# Patient Record
(demographics unavailable — no encounter records)

---

## 2024-10-31 NOTE — HISTORY OF PRESENT ILLNESS
[N] : Patient reports normal menses [IUD] : has an intrauterine device [Y] : Positive pregnancy history [Menarche Age: ____] : age at menarche was [unfilled] [No] : Patient does not have concerns regarding sex [Currently Active] : currently active [Men] : men [Mammogramdate] : 08/22/24 [TextBox_19] : BR0 [LMPDate] : 10/30/24 [de-identified] : PARAGARD [PGHxTotal] : 3 [[facility]xFul[facility]] : 1 [[facility] Center[facility]] : 1 [PGHxABSpont] : 2 [FreeTextEntry1] : 10/30/24

## 2024-10-31 NOTE — DISCUSSION/SUMMARY
[FreeTextEntry1] : 41 y/o P1 presenting for f/u of Paragard and fibroid today. Reviewed Paragard in proper position, fibroid stable - Discussed posterior myoma and possible submucosal component. Discussed slightly higher risk of Paragard expulsion with this in place but given last Paragard stayed in place this one likely will as well. - Patient expressed concerns about fibroid and whether further treatment is needed. Discussed that fibroids can cause heavy and painful menses but as long as she does not have these symptoms, no further management of them is needed, can continue to observe. - Advised change in bleeding pattern likely from new Paragard insertion, should return to baseline after 3 months - Discussed if bleeding still bothersome after 3 months, could be myoma. Discussed hormonal management options and pt reports bad side effects to hormonal contraceptives in the past. Discussed surgical options and that it appears if surgery is needed, based on sono appearance could likely be hysteroscopic, although would need MRI to confirm. - Continue to monitor fibroid for now, return in 6 months for sono to assess stability in size.  Angel Mac MD.

## 2024-12-08 NOTE — HISTORY OF PRESENT ILLNESS
[Menarche Age: ____] : age at menarche was [unfilled] [No] : Patient does not have concerns regarding sex [Currently Active] : currently active [Men] : men [IUD] : has an intrauterine device [Y] : Positive pregnancy history [Mammogramdate] : 08/22/24 [TextBox_19] : BR-0 [GonorrheaDate] : 09/19/24 [TextBox_63] : neg [ChlamydiaDate] : 09/19/24 [TextBox_68] : neg [LMPDate] : 11/23/24 [de-identified] : PARAGARD [PGHxTotal] : 3 [[facility]xFulerm] : 1 [[facility]iving] : 1 [PGHxABSpont] : 2 [FreeTextEntry1] : 11/23/24

## 2024-12-08 NOTE — DISCUSSION/SUMMARY
[FreeTextEntry1] : 42 y/o with paraguard IUD in place complains of one episode irregular bleeding -  reviewing the patient's history and results discussed with patient this is not likely to be abnormal it is not unusual to have 1-2 days of dark old blood prior to menses.  I do not believe this is related to her fibroid. Reviewed recent blood work - patient was midcycle when it was done - elevated FSH could be related to perimenopause/menopausal changes however given the timing unlikely to be accurate  Patient states she is very happy with paraguard and not interested in changing IUDs  At this time will continue to monitor

## 2024-12-08 NOTE — REASON FOR VISIT
[Follow-Up] : a follow-up evaluation of [FreeTextEntry2] : F/U visit regarding irregular Menses and Sono report. She deals with Candida infections often

## 2024-12-08 NOTE — HISTORY OF PRESENT ILLNESS
[Menarche Age: ____] : age at menarche was [unfilled] [No] : Patient does not have concerns regarding sex [Currently Active] : currently active [Men] : men [IUD] : has an intrauterine device [Y] : Positive pregnancy history [Mammogramdate] : 08/22/24 [TextBox_19] : BR-0 [GonorrheaDate] : 09/19/24 [TextBox_63] : neg [ChlamydiaDate] : 09/19/24 [TextBox_68] : neg [LMPDate] : 11/23/24 [de-identified] : PARAGARD [PGHxTotal] : 3 [[facility] CenterxFul[facility]] : 1 [[facility]iving] : 1 [PGHxABSpont] : 2 [FreeTextEntry1] : 11/23/24

## 2025-04-21 NOTE — REASON FOR VISIT
[Follow-Up] : a follow-up evaluation of [FreeTextEntry2] : Possible Infection  pt reports burning sensation during urination, Frequent urination,

## 2025-04-21 NOTE — RESULTS/DATA
[Extensive counseling about Hormone Replacement Therapy. Reviewed both FDA-approved and non-FDA-approved options] : Extensive counseling about Hormone Replacement Therapy. Reviewed both FDA-approved and non-FDA-approved options such as estrogen/progesterone options, bioidentical hormones, compounded formulation, and pellets. All concerns were addressed, and questions were answered. [Reviewed holistic approach including cooling blankets, fans, monitoring of triggers and avoiding them as possible] : Reviewed holistic approach including cooling blankets, fans, monitoring of triggers and avoiding them as possible, herbal treatments - (not studied in RCTs), acupuncture, stress management, diet & exercise, CBT, SSRIs, Veozah, Gabapentin, Hormonal Therapy.

## 2025-04-21 NOTE — PLAN
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

## 2025-04-21 NOTE — PHYSICAL EXAM
[Appropriately responsive] : appropriately responsive [Alert] : alert [No Acute Distress] : no acute distress [Oriented x3] : oriented x3 [FreeTextEntry8] : mood- anxious, affect congruent, speech- normal rate and prosody, appearance- neat, hijab worn, clean, thought process logical, goal directed, no psychomotor activity, No SI or HI

## 2025-04-21 NOTE — HISTORY OF PRESENT ILLNESS
[Y] : Positive pregnancy history [Menarche Age: ____] : age at menarche was [unfilled] [Men] : men [No] : No [Mammogramdate] : 08/22/2024 [TextBox_19] : Bilateral br0 [BreastSonogramDate] : 08/27/2024 [TextBox_25] : Complete RT breast   br2 [PapSmeardate] : 08/2024 [TextBox_31] : wnl per pt  [BoneDensityDate] : n/a [TextBox_43] :  wnl per pt, every 10yrs, 08/2023.   endoscopy: wnl per pt, pt states has colon issues, diagnosed with fibromyalgia [GonorrheaDate] : 09/19/2024 [TextBox_63] : neg  [ChlamydiaDate] : 09/19/2024 [TextBox_68] : neg  [LMPDate] : 04/11/2025 [de-identified] : ParaGard-09/19/2024 [PGHxTotal] : 3 [[facility]] : 1 [[facility]iving] : 1 [PGHxABSpont] : 2 [FreeTextEntry1] : pt states pain during intercourse, vaginal dryness, Low libido tried cocnut oil [FreeTextEntry2] :

## 2025-05-02 NOTE — PHYSICAL EXAM
Name band; [Chaperoned Physical Exam] : A chaperone was present in the examining room during all aspects of the physical examination. [Appropriately responsive] : appropriately responsive [Alert] : alert [No Acute Distress] : no acute distress [Oriented x3] : oriented x3 [Labia Majora] : normal [Labia Minora] : normal [IUD String] : an IUD string was noted [Normal] : normal [Uterine Adnexae] : normal [FreeTextEntry4] : Some tension noted at the pelvic floor muscles, worse on right.

## 2025-05-02 NOTE — HISTORY OF PRESENT ILLNESS
[Patient reported PAP Smear was normal] : Patient reported PAP Smear was normal [Patient reported colonoscopy was normal] : Patient reported colonoscopy was normal [IUD] : has an intrauterine device [Y] : Positive pregnancy history [Menarche Age: ____] : age at menarche was [unfilled] [No] : Patient does not have concerns regarding sex [Currently Active] : currently active [Mammogramdate] : 08/22/24 [TextBox_19] : BR0 [PapSmeardate] : 08/2024 [ColonoscopyDate] : 08/2023 [GonorrheaDate] : 09/19/24 [TextBox_63] : NEG [ChlamydiaDate] : 09/19/24 [TextBox_68] : NEG [LMPDate] : 04/11/25 [de-identified] : PARAGARD [PGHxTotal] : 3 [[facility] CenterxFul[facility]] : 1 [[facility]iving] : 1 [PGHxABSpont] : 2 [FreeTextEntry1] : 04/11/25

## 2025-05-02 NOTE — DISCUSSION/SUMMARY
[FreeTextEntry1] : 42 y/o P1 LMP 4/11 with submucosal myoma, previously asymptomatic but now with spotting surrounding menses and slightly heavier menses, also with symptoms concerning perimenopause but with regular menses still and a borderline normal FSH, presenting to discuss management.  #Fibroid/Bleeding - Reviewed it is possible myoma is contributing to bleeding changes, given low invasiveness can consider hysteroscopic myomectomy to remove. Pt interested in this. - Pelvic MRI needed prior to surgery as on sono it is unclear if the myoma reaches the serosa. Still primarily intracavitary thus hysteroscopy still appropriate, but if near serosa may intentionally need to leave 5-10% of myoma in situ to avoid perforation. - Task sent to schedule hysteroscopic myomectomy, Paragard removal, Paragard insertion  #Vaginal dryness/pain with sex - Reviewed possible component of PFD, recommended pelvic floor PT as it will help with this. Pt interested in this, referral given today - Reviewed no obvious atrophy on exam, but given complaint of dryness, trial of vaginal estrogen could be considered. May not help, but also has virtually no risk. Pt expressed understanding, wishes to tyr vaginal estrogen, script sent today.  #Hot flashes - Possible perimenopause but given regular menses and FSH still normal, may not be related - Will recheck FSH at next visit, but if still not menopausal range unsure if related. Discussed systemic E2 can be considered to see if it helps with symptoms, but will hold off on this for now, can consider next visit  - Return in 6w for visit to check FSH and check in on vaginal estradiol use  Angel Mac MD

## 2025-05-02 NOTE — PHYSICAL EXAM
[Chaperoned Physical Exam] : A chaperone was present in the examining room during all aspects of the physical examination. [Appropriately responsive] : appropriately responsive [Alert] : alert [No Acute Distress] : no acute distress [Oriented x3] : oriented x3 [Labia Majora] : normal [Labia Minora] : normal [IUD String] : an IUD string was noted [Normal] : normal [Uterine Adnexae] : normal [FreeTextEntry4] : Some tension noted at the pelvic floor muscles, worse on right.

## 2025-05-02 NOTE — HISTORY OF PRESENT ILLNESS
[Patient reported PAP Smear was normal] : Patient reported PAP Smear was normal [Patient reported colonoscopy was normal] : Patient reported colonoscopy was normal [IUD] : has an intrauterine device [Y] : Positive pregnancy history [Menarche Age: ____] : age at menarche was [unfilled] [No] : Patient does not have concerns regarding sex [Currently Active] : currently active [Mammogramdate] : 08/22/24 [TextBox_19] : BR0 [PapSmeardate] : 08/2024 [ColonoscopyDate] : 08/2023 [GonorrheaDate] : 09/19/24 [TextBox_63] : NEG [ChlamydiaDate] : 09/19/24 [TextBox_68] : NEG [LMPDate] : 04/11/25 [de-identified] : PARAGARD [PGHxTotal] : 3 [[facility]alexFulerm] : 1 [[facility] Servicesiving] : 1 [PGHxABSpont] : 2 [FreeTextEntry1] : 04/11/25

## 2025-05-23 NOTE — PROCEDURE
[IUD Removal] : intrauterine device (IUD) removal [Time out performed] : Pre-procedure time out performed.  Patient's name, date of birth and procedure confirmed. [Consent Obtained] : Consent obtained [Risks] : risks [Benefits] : benefits [Alternatives] : alternatives [Patient] : patient [Speculum Placed] : speculum placed [Strings Visualized] : strings visualized [IUD Discarded] : IUD discarded [Sent to Pathology] : specimen was placed in buffered formalin and sent for pathology [Tolerated Well] : Patient tolerated the procedure well [No Complications] : no complications [Heavy Vaginal Bleeding] : for heavy vaginal bleeding [Pelvic Pain] : for pelvic pain [de-identified] : Pain

## 2025-05-23 NOTE — RESULTS/DATA
[TextEntry] : TVUS with remaining fibroid seen 2.7 x 2.2 x 2.1 cm, also with possibility of Paragard being malrotated, seen at fundus with what appears to be the shaft at fundus, arms not clear, and nothing seen lower in the cavity on sagittal, but is difficult to assess.

## 2025-05-23 NOTE — HISTORY OF PRESENT ILLNESS
[Patient reported PAP Smear was normal] : Patient reported PAP Smear was normal [Patient reported colonoscopy was normal] : Patient reported colonoscopy was normal [IUD] : has an intrauterine device [Y] : Positive pregnancy history [Menarche Age: ____] : age at menarche was [unfilled] [No] : Patient does not have concerns regarding sex [Currently Active] : currently active [LMP unknown] : LMP unknown [unknown] : Patient is unsure of the date of her LMP [Mammogramdate] : 08/22/24 [TextBox_19] : BR0 [BreastSonogramDate] : 08/27/24 [TextBox_25] : BR2 [PapSmeardate] : 08/2024 [ColonoscopyDate] : 08/2023 [de-identified] : PARAGARD [PGHxTotal] : 3 [[facility]xFulerm] : 1 [[facility] Centeriving] : 1 [PGHxABSpont] : 2 [FreeTextEntry1] : C/S-1

## 2025-05-23 NOTE — DISCUSSION/SUMMARY
[FreeTextEntry1] : 42 y/o s/p hysteroscopic myomectomy on 5/12 with Paragard replacement. Procedure reached fluid deficit maximum and thus remaining portions of myoma were known to be present, appears to be 2.66 x 2.22 x 2.11 cm today, was previously 3.7 x 3.6 x 3.2 cm on MRI prior to surgery, also with possible malpositioning of IUD on imaging. Difficult to assess exact position, likely due to remaining fibroid and possible remaining debris in endometrium from procedure as well, thus there is a chance it is in the correct position still. - Reviewed operative findings with images, benign pathology, and remaining portions of fibroid - Reviewed regarding IUD, there is a chance it is still in the correct location, but given her pain, it is reasonable to remove and wait to reinsert after she recovers. Advised alternative to removal would be to consider diagnostic hysteroscopy in office today to assess position. - After discussion, pt desired Paragard removal today. Paragard removed intact without issue as above. - Given uterine tenderness on exam and pain, will treat with doxycycline 100 mg BID for 2 weeks for a possible post-procedure endometritis. Also sent culture for IUD - For longer term follow up, plan to repeat sono in 4-6 weeks to reassess remaining fibroid after she has recovered more and there is more time for any remaining debris to pass. - Pending upcoming sono, will determine if Paragard can be reinserted in office and remaining fibroid is small enough to observe, vs if a second hysteroscopic myomectomy with Paragard insertion would be more appropriate. Pt expressed understanding. - Advised pain should slowly improve over next few days, if any worsening, she should call office. - Return in 4-6w for sono and visit.  Angel Mac MD

## 2025-05-23 NOTE — PHYSICAL EXAM
[Chaperoned Physical Exam] : A chaperone was present in the examining room during all aspects of the physical examination. [Appropriately responsive] : appropriately responsive [Alert] : alert [No Acute Distress] : no acute distress [Labia Majora] : normal [Labia Minora] : normal [Discharge] : a  ~M vaginal discharge was present [Scant] : scant [Brown] : brown [IUD String] : an IUD string was noted [Normal] : normal [Tenderness] : tender [Uterine Adnexae] : normal

## 2025-06-25 NOTE — DISCUSSION/SUMMARY
[FreeTextEntry1] : 42 y/o s/p hysteroscopic myomectomy on 5/12 with Paragard replacement, subsequent removal of Paragard on 5/12, presenting for follow up of submucosal fibroid as myomectomy had to end prior to complete resection due to fluid deficit maximum. Pt complaining of persistent irregular bleeding today. Fibroid still 2.8 x 3.2 x 2.8 on sono today, was previously 3.7 x 3.6 x 3.2 cm on MRI prior to surgery. - Reviewed irregular bleeding may still be due to recent procedure and Paragard removal. Discussed normal menses should return by the end of July, and that if not, we may need to evaluate further. - Regarding fibroid and desire for Paragard, advised that I would recommend repeat hysteroscopic myomectomy and Paragard reinsertion if Paragard still desired. I reviewed that Paragard insertion could be attempted in office again, but I would be concerned about malpositioning and irregular bleeding again as long as fibroid is still present. We also discussed that if Paragard is no longer desired, for fibroid itself we can monitor bleeding and if not bothersome or heavy, can continue to observe fibroid. - After discussion, pt wishes to continue to monitor symptoms and bleeding. Plan to return in 1-2 months and decide how to proceed at that time.  Angel Mac MD

## 2025-06-25 NOTE — HISTORY OF PRESENT ILLNESS
[Patient reported PAP Smear was normal] : Patient reported PAP Smear was normal [LMP unknown] : LMP unknown [Irregular Cycle Intervals] : are  irregular [N] : Patient does not use contraception [Y] : Positive pregnancy history [unknown] : Patient is unsure of the date of her LMP [Menarche Age: ____] : age at menarche was [unfilled] [No] : Patient does not have concerns regarding sex [Mammogramdate] : 08/22/24 [TextBox_19] : BR-0 [BreastSonogramDate] : 08/27/24 [TextBox_25] : R BREAST BR-2 [PapSmeardate] : 08/2024 [ColonoscopyDate] : 08/2023 [GonorrheaDate] : 09/19/24 [TextBox_63] : NEG [ChlamydiaDate] : 09/19/24 [TextBox_68] : NEG [PGHxTotal] : 4 [[facility]xFulerm] : 1 [[facility]iving] : 1 [PGxABSpont] : 3 [FreeTextEntry1] : C/S X1

## 2025-06-25 NOTE — HISTORY OF PRESENT ILLNESS
[Patient reported PAP Smear was normal] : Patient reported PAP Smear was normal [LMP unknown] : LMP unknown [Irregular Cycle Intervals] : are  irregular [N] : Patient does not use contraception [Y] : Positive pregnancy history [unknown] : Patient is unsure of the date of her LMP [Menarche Age: ____] : age at menarche was [unfilled] [No] : Patient does not have concerns regarding sex [Mammogramdate] : 08/22/24 [TextBox_19] : BR-0 [BreastSonogramDate] : 08/27/24 [TextBox_25] : R BREAST BR-2 [PapSmeardate] : 08/2024 [ColonoscopyDate] : 08/2023 [GonorrheaDate] : 09/19/24 [TextBox_63] : NEG [ChlamydiaDate] : 09/19/24 [TextBox_68] : NEG [PGHxTotal] : 4 [[facility]xFul[facility]] : 1 [PGxABSpont] : 3 [[facility]iving] : 1 [FreeTextEntry1] : C/S X1